# Patient Record
Sex: MALE | Race: WHITE | NOT HISPANIC OR LATINO
[De-identification: names, ages, dates, MRNs, and addresses within clinical notes are randomized per-mention and may not be internally consistent; named-entity substitution may affect disease eponyms.]

---

## 2018-09-25 ENCOUNTER — RESULT REVIEW (OUTPATIENT)
Age: 68
End: 2018-09-25

## 2018-11-07 PROBLEM — Z00.00 ENCOUNTER FOR PREVENTIVE HEALTH EXAMINATION: Status: ACTIVE | Noted: 2018-11-07

## 2018-12-01 ENCOUNTER — CLINICAL ADVICE (OUTPATIENT)
Age: 68
End: 2018-12-01

## 2019-01-16 ENCOUNTER — RECORD ABSTRACTING (OUTPATIENT)
Age: 69
End: 2019-01-16

## 2019-01-16 DIAGNOSIS — Z78.9 OTHER SPECIFIED HEALTH STATUS: ICD-10-CM

## 2019-01-17 ENCOUNTER — RECORD ABSTRACTING (OUTPATIENT)
Age: 69
End: 2019-01-17

## 2019-01-17 DIAGNOSIS — K22.719 BARRETT'S ESOPHAGUS WITH DYSPLASIA, UNSPECIFIED: ICD-10-CM
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
## 2019-01-17 DIAGNOSIS — Z87.19 PERSONAL HISTORY OF OTHER DISEASES OF THE DIGESTIVE SYSTEM: ICD-10-CM

## 2019-01-17 DIAGNOSIS — S49.90XA UNSPECIFIED INJURY OF SHOULDER AND UPPER ARM, UNSPECIFIED ARM, INITIAL ENCOUNTER: ICD-10-CM

## 2019-01-17 DIAGNOSIS — Z82.49 FAMILY HISTORY OF ISCHEMIC HEART DISEASE AND OTHER DISEASES OF THE CIRCULATORY SYSTEM: ICD-10-CM

## 2019-01-17 DIAGNOSIS — N52.9 MALE ERECTILE DYSFUNCTION, UNSPECIFIED: ICD-10-CM

## 2019-01-17 DIAGNOSIS — Z87.898 PERSONAL HISTORY OF OTHER SPECIFIED CONDITIONS: ICD-10-CM

## 2019-01-17 DIAGNOSIS — Z87.81 PERSONAL HISTORY OF (HEALED) TRAUMATIC FRACTURE: ICD-10-CM

## 2019-01-17 DIAGNOSIS — E79.0 HYPERURICEMIA W/OUT SIGNS OF INFLAMMATORY ARTHRITIS AND TOPHACEOUS DISEASE: ICD-10-CM

## 2019-01-17 DIAGNOSIS — Z87.39 PERSONAL HISTORY OF OTHER DISEASES OF THE MUSCULOSKELETAL SYSTEM AND CONNECTIVE TISSUE: ICD-10-CM

## 2019-01-17 DIAGNOSIS — S01.85XA OPEN BITE OF OTHER PART OF HEAD, INITIAL ENCOUNTER: ICD-10-CM

## 2019-01-17 DIAGNOSIS — Z85.840 PERSONAL HISTORY OF MALIGNANT NEOPLASM OF EYE: ICD-10-CM

## 2019-01-17 DIAGNOSIS — Z86.39 PERSONAL HISTORY OF OTHER ENDOCRINE, NUTRITIONAL AND METABOLIC DISEASE: ICD-10-CM

## 2019-01-17 DIAGNOSIS — Z86.010 PERSONAL HISTORY OF COLONIC POLYPS: ICD-10-CM

## 2019-01-17 DIAGNOSIS — S86.019A STRAIN OF UNSPECIFIED ACHILLES TENDON, INITIAL ENCOUNTER: ICD-10-CM

## 2019-01-17 DIAGNOSIS — Z92.3 PERSONAL HISTORY OF IRRADIATION: ICD-10-CM

## 2019-01-17 DIAGNOSIS — S42.301A UNSPECIFIED FRACTURE OF SHAFT OF HUMERUS, RIGHT ARM, INITIAL ENCOUNTER FOR CLOSED FRACTURE: ICD-10-CM

## 2019-01-18 ENCOUNTER — NON-APPOINTMENT (OUTPATIENT)
Age: 69
End: 2019-01-18

## 2019-01-18 ENCOUNTER — APPOINTMENT (OUTPATIENT)
Dept: CARDIOLOGY | Facility: CLINIC | Age: 69
End: 2019-01-18
Payer: MEDICARE

## 2019-01-18 VITALS
HEART RATE: 72 BPM | HEIGHT: 72 IN | SYSTOLIC BLOOD PRESSURE: 120 MMHG | WEIGHT: 190 LBS | DIASTOLIC BLOOD PRESSURE: 68 MMHG | BODY MASS INDEX: 25.73 KG/M2

## 2019-01-18 PROCEDURE — 93000 ELECTROCARDIOGRAM COMPLETE: CPT

## 2019-01-18 PROCEDURE — 99214 OFFICE O/P EST MOD 30 MIN: CPT

## 2019-01-18 RX ORDER — RAMIPRIL 10 MG/1
10 CAPSULE ORAL
Refills: 0 | Status: DISCONTINUED | COMMUNITY
End: 2019-01-18

## 2019-01-18 RX ORDER — ASPIRIN ENTERIC COATED TABLETS 81 MG 81 MG/1
81 TABLET, DELAYED RELEASE ORAL
Refills: 0 | Status: DISCONTINUED | COMMUNITY
End: 2019-01-18

## 2019-01-18 RX ORDER — SIMVASTATIN 20 MG/1
20 TABLET, FILM COATED ORAL
Refills: 0 | Status: DISCONTINUED | COMMUNITY
End: 2019-01-18

## 2019-01-18 RX ORDER — COLCHICINE 0.6 MG/1
0.6 TABLET ORAL TWICE DAILY
Refills: 0 | Status: ACTIVE | COMMUNITY
Start: 2019-01-18

## 2019-01-18 RX ORDER — HYDROCHLOROTHIAZIDE 12.5 MG/1
TABLET ORAL
Refills: 0 | Status: DISCONTINUED | COMMUNITY

## 2019-01-18 NOTE — DISCUSSION/SUMMARY
[FreeTextEntry1] : Hypertension.\par Good control. His diuretic has been changed from hydrochlorothiazide to indapamide to help with uric acid.\par \par Dyslipidemia.\par This has been under control. It is reasonable to change his simvastatin to rosuvastatin 5 mg daily.\par \par Carotid atherosclerosis.\par Based on previous carotid duplex 2015. Insignificant plaque. Continue risk factor reduction.

## 2019-01-18 NOTE — HISTORY OF PRESENT ILLNESS
[FreeTextEntry1] : Since his last examination he has reported some medical interactions. He reports that he was not taking his allopurinol regularly. 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 He did take colchicine with improvement. He is now back on his medications. A change of his hydrochlorothiazide to indapamide was also made.\par \par The patient presents sending Samir from his primary care physician, Dr. Lulú Vanegas, inquiring regarding a change of his statin. This is certainly reasonable. There is a potential interaction between simvastatin and verapamil.\par \par There have been no symptoms of chest discomfort, shortness of breath, palpitation, lightheadedness. He admits he is not as active as he should be. I incurred him regarding this.

## 2019-01-18 NOTE — REASON FOR VISIT
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

## 2019-01-18 NOTE — PHYSICAL EXAM
[General Appearance - Well Developed] : well developed [Normal Appearance] : normal appearance [Well Groomed] : well groomed [General Appearance - Well Nourished] : well nourished [No Deformities] : no deformities [General Appearance - In No Acute Distress] : no acute distress [Normal Oral Mucosa] : normal oral mucosa [No Oral Pallor] : no oral pallor [No Oral Cyanosis] : no oral cyanosis [Normal Jugular Venous A Waves Present] : normal jugular venous A waves present [Normal Jugular Venous V Waves Present] : normal jugular venous V waves present [No Jugular Venous Reyes A Waves] : no jugular venous reyes A waves [Respiration, Rhythm And Depth] : normal respiratory rhythm and effort [Exaggerated Use Of Accessory Muscles For Inspiration] : no accessory muscle use [Auscultation Breath Sounds / Voice Sounds] : lungs were clear to auscultation bilaterally [Heart Rate And Rhythm] : heart rate and rhythm were normal [Heart Sounds] : normal S1 and S2 [Murmurs] : no murmurs present [Abdomen Soft] : soft [Abdomen Tenderness] : non-tender [Abdomen Mass (___ Cm)] : no abdominal mass palpated [Abnormal Walk] : normal gait [Gait - Sufficient For Exercise Testing] : the gait was sufficient for exercise testing [Nail Clubbing] : no clubbing of the fingernails [Cyanosis, Localized] : no localized cyanosis [Petechial Hemorrhages (___cm)] : no petechial hemorrhages [Skin Color & Pigmentation] : normal skin color and pigmentation [] : no rash [No Venous Stasis] : no venous stasis [Skin Lesions] : no skin lesions [No Skin Ulcers] : no skin ulcer [No Xanthoma] : no  xanthoma was observed [Oriented To Time, Place, And Person] : oriented to person, place, and time [Affect] : the affect was normal [Mood] : the mood was normal [No Anxiety] : not feeling anxious [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

## 2019-02-07 ENCOUNTER — APPOINTMENT (OUTPATIENT)
Dept: UROLOGY | Facility: CLINIC | Age: 69
End: 2019-02-07
Payer: MEDICARE

## 2019-02-07 VITALS
HEART RATE: 63 BPM | HEIGHT: 72 IN | BODY MASS INDEX: 25.73 KG/M2 | DIASTOLIC BLOOD PRESSURE: 56 MMHG | SYSTOLIC BLOOD PRESSURE: 144 MMHG | WEIGHT: 190 LBS

## 2019-02-07 LAB
BILIRUB UR QL STRIP: NORMAL
CLARITY UR: CLEAR
COLLECTION METHOD: NORMAL
GLUCOSE UR-MCNC: NORMAL
HCG UR QL: NORMAL EU/DL
HGB UR QL STRIP.AUTO: NORMAL
KETONES UR-MCNC: NORMAL
LEUKOCYTE ESTERASE UR QL STRIP: NORMAL
NITRITE UR QL STRIP: NORMAL
PH UR STRIP: 6
PROT UR STRIP-MCNC: NORMAL
SP GR UR STRIP: 1.01

## 2019-02-07 PROCEDURE — 81000 URINALYSIS NONAUTO W/SCOPE: CPT

## 2019-02-07 PROCEDURE — 99213 OFFICE O/P EST LOW 20 MIN: CPT | Mod: 25

## 2019-02-07 RX ORDER — VERAPAMIL HYDROCHLORIDE 240 MG/1
240 TABLET ORAL
Qty: 90 | Refills: 3 | Status: DISCONTINUED | COMMUNITY
End: 2019-02-07

## 2019-02-07 RX ORDER — LEVOTHYROXINE SODIUM 0.11 MG/1
112 TABLET ORAL DAILY
Refills: 0 | Status: ACTIVE | COMMUNITY

## 2019-03-01 LAB
BACTERIA: 0
CASTS: 0
CRYSTALS: 0
MUCUS: 0
PSA FREE FLD-MCNC: 33 %
PSA FREE SERPL-MCNC: 0.43 NG/ML
PSA SERPL-MCNC: 1.32 NG/ML
RBC CASTS # UR COMP ASSIST: 0
WBC: 0

## 2019-03-01 NOTE — PHYSICAL EXAM
[General Appearance - Well Nourished] : well nourished [Normal Appearance] : normal appearance [Well Groomed] : well groomed [General Appearance - In No Acute Distress] : no acute distress [Bowel Sounds] : normal bowel sounds [Abdomen Soft] : soft [Abdomen Tenderness] : non-tender [Abdomen Mass (___ Cm)] : no abdominal mass palpated [Abdomen Hernia] : no hernia was discovered [Costovertebral Angle Tenderness] : no ~M costovertebral angle tenderness [Size (3+)] : size was 3+ [Nl Sphincter Tone] : normal sphincter tone [Nl Perineum] : perineum was normal on inspection [No Lesions] : no lesions [Circumcised] : the penis was circumcised [Normal] : normal [Testes] : normal [Epididymis] : was normal [5th Left ICS - MCL] : palpated at the 5th LICS in the midclavicular line [Normal Rate] : normal [Normal S1] : normal S1 [Normal S2] : normal S2 [No Murmur] : no murmurs heard [No Pitting Edema] : no pitting edema present [] : no respiratory distress [Respiration, Rhythm And Depth] : normal respiratory rhythm and effort [Exaggerated Use Of Accessory Muscles For Inspiration] : no accessory muscle use [Auscultation Breath Sounds / Voice Sounds] : lungs were clear to auscultation bilaterally [Chest Palpation] : palpation of the chest revealed no abnormalities [Lungs Percussion] : the lungs were normal to percussion [Oriented To Time, Place, And Person] : oriented to person, place, and time [Affect] : the affect was normal [Mood] : the mood was normal [Not Anxious] : not anxious [Normal Station and Gait] : the gait and station were normal for the patient's age [No Focal Deficits] : no focal deficits [No Palpable Adenopathy] : no palpable adenopathy [Prostate Hard Area Or Nodule On The Right] : had a palpable nodule [Rectal Exam - Prostate] : was not indurated [Prostate Tenderness] : was not tender [Prostate Fluctuant] : was not fluctuant [Prostate Hard Area Or Nodule On The Left] : had no palpable nodules [Occult Blood Positive] : exam was negative for occult blood [Rectal Tenderness] : no tenderness [Mass___cm] : no rectal masses [S3] : no S3 [S4] : no S4

## 2019-03-01 NOTE — HISTORY OF PRESENT ILLNESS
[FreeTextEntry1] : (Last seen 11/06/18)\par \par        Seb Rosas is a 68-year-old gentleman with history of biopsy proven prostate cancer though with extremely small volumes of low-grade disease managed with "watchful waiting" for the past 8-1/2 years. Mr. Rosas's most recent PSA from 11/06/18 was 1.21 with a free PSA of 32%.\par  \par        Mr. Rosas also has a history of modest obstructive voiding symptoms and marked prostatic enlargement further complicated by the presence of a large ball valving median lobe. His postvoid residual has remained in the range 100 cc. Cystoscopy performed on 11/06/17 confirmed significant outlet obstruction, 3+ bladder trabeculation, a compromised urinary stream, and the postvoid residual of approximately 170 cc. Mr. Rosas he has been reluctant to undergo any surgical intervention despite significant prostatic enlargement. On last evaluated in August of 2018 the prostatic volume was found to be 159.01 cc using ultrasound criteria. At that time the postvoid residual was 81 cc with double voiding. He is aware that failure to respond to pharmacologic management would warrant surgical intervention. He is currently on tamsulosin b.i.d.\par        .\par        PERTINENT UROLOGIC HISTORY:\par        .\par         07/15/10: PSA 1.40; free PSA 14%, TRUS positive for a right-sided hypoechoic nodule, gland estimated to be 79 cc\par         08/04/10: PROSTATE BIOPSY (+); right sided nodule, 2 of 12 biopsies positive for Priscilla 6 (3+3) involving no more than 1% of each core, prepped second opinion declared one of the biopsies negative the other equivocal)\par         01/09/11: PROSTATE BIOPSY #2- (-) for cancer, atypia in one biopsy on the left\par         05/03/11: PSA 2.39; free PSA 10% ([facility])\par         09/02/11: PSA 0.90; free PSA 20%\par         04/13/12: PSA 1.26; free PSA 22%\par         09/20/11: MRI- "abnormal signal of the LEFT aspect of the peripheral zone without extension through the capsule" (findings are consistent with the physical exam, ultrasound findings, and actual biopsy results)\par         10/18/13: PSA 0.58; free PSA 40% ([facility])\par         04/29/14: PSA 0.83; free PSA 20%\par         09/25/15: PSA 0.99; testosterone 147\par         02/25/16: PSA 1.86; free PSA 20% (The change in PSA was attributed to the fact the patient was seen in the new offices in the lab test was sent to the laboratory)\par        05/24/16: PSA 1.77; free PSA 22%\par        08/06/18: PELVIC ULTRASONOGRAPHY; prostatic volume 159.01 cc; PPSA 19.08; postvoid residual (with double voiding) 81 cc\par        08/06/16: PSA 1.20 ng/ml; free PSA 32%\par        03/16/17: PSA 1.15; free PSA 33%\par        03/16/17: PELVIC ULTRASONOGRAPHY; postvoid residual 100 cc; prostatic volume 109 cc; PPSA 13.13; (2000 and prostatic volume was 79 cc)\par        09/25/17: PSA 1.13\par        02/06/18: PSA 1.23; free PSA 33%\par 11/06/18: PSA 1.21; free PSA 32%\par \par CURRENT UROLOGIC REVIEW OF SYSTEMS:\par \par Incontinence Assessed?.  YES\par \par Frequency:  (+) 6 x/day    \par Nocturia:  (+) 3 x/night\par Dysuria: (-)\par Urgency:  (-) \par Hesitancy:  (-)\par Intermittency:  (-)\par Sensation of Incomplete Voiding :  (-)\par Double voiding :  (-)\par Stress incontinence:  (-)\par Urge incontinence:  (-)\par Use of absorbent pads:  (-)\par Terminal dribbling:  (-)\par Status of stream:   "VARIES, CERTAINLY NOT STRONG"\par Venereal disease:  (-)\par Kidney stones:  (-)\par UTIs:  (-)\par Prior urologic surgery:  (+) (PBx)\par Hematuria:  (-)\par Abdominal pressure:  (-)\par Back pain:  (-) CHRONIC NON-UROLOGIC BACK PAIN\par Sexual Status. ACTIVE; BETTER WITH SILDENAFIL\par Gout:  (+)  LAST ATTACK 11/2018\par Renal problems:  (-)\par Family History of Urologic Problems:  (+) SON HAS HAD KIDNEY STONES\par International Prostate Symptom Score (IPSS): 14 (Moderate 8-19)\par Satisfaction Index:  2 (mostly satisfied)\par \par \par

## 2019-03-01 NOTE — CHIEF COMPLAINT
[FreeTextEntry1] : 1.  PROSTATE CANCER:  Managed with "watchful waiting since 08/04/10\par \par 2.  BPH/LUTS WITH INCOMPLETE EMPTYING

## 2019-03-01 NOTE — ASSESSMENT
[FreeTextEntry1] : Seb Rosas is a 69yo man with a history of low grade low volume prostate cancer managed with watchful waiting.  He has no evidence of clinically significant disease.  He still has a large prostate with acceptable LUTS.

## 2019-03-01 NOTE — REVIEW OF SYSTEMS
[see HPI] : see HPI [Negative] : Endocrine [Fever] : no fever [Chills] : no chills [Feeling Poorly] : not feeling poorly [Feeling Tired] : not feeling tired [Recent Weight Gain (___ Lbs)] : no recent weight gain [Recent Weight Loss (___ Lbs)] : no recent weight loss [Loss Of Hearing] : no hearing loss [Nosebleeds] : no nosebleeds [Chest Pain] : no chest pain [Palpitations] : no palpitations [Leg Claudication] : no intermittent leg claudication [Lower Ext Edema] : no extremity edema [Shortness Of Breath] : no shortness of breath [Cough] : no cough [Orthopnea] : no orthopnea [Wheezing] : no wheezing [SOB on Exertion] : no shortness of breath during exertion [PND] : no PND [Abdominal Pain] : no abdominal pain [Vomiting] : no vomiting [Constipation] : no constipation [Diarrhea] : no diarrhea [Heartburn] : no heartburn [Melena] : no melena [Arthralgias] : no arthralgias [Joint Pain] : no joint pain [Suicidal] : not suicidal [Sleep Disturbances] : no sleep disturbances [Anxiety] : no anxiety [Depression] : no depression [Easy Bleeding] : no tendency for easy bleeding [Easy Bruising] : no tendency for easy bruising [Swollen Glands] : no swollen glands

## 2019-07-13 ENCOUNTER — RX RENEWAL (OUTPATIENT)
Age: 69
End: 2019-07-13

## 2019-08-08 ENCOUNTER — APPOINTMENT (OUTPATIENT)
Dept: UROLOGY | Facility: CLINIC | Age: 69
End: 2019-08-08
Payer: MEDICARE

## 2019-08-08 VITALS
BODY MASS INDEX: 25.9 KG/M2 | SYSTOLIC BLOOD PRESSURE: 144 MMHG | WEIGHT: 191 LBS | DIASTOLIC BLOOD PRESSURE: 72 MMHG | HEART RATE: 73 BPM

## 2019-08-08 DIAGNOSIS — R33.9 RETENTION OF URINE, UNSPECIFIED: ICD-10-CM

## 2019-08-08 DIAGNOSIS — N40.1 BENIGN PROSTATIC HYPERPLASIA WITH LOWER URINARY TRACT SYMPMS: ICD-10-CM

## 2019-08-08 LAB
BACTERIA: 0
BILIRUB UR QL STRIP: NORMAL
CASTS: 0
CLARITY UR: CLEAR
COLLECTION METHOD: NORMAL
CRYSTALS: 0
EPITHELIAL CELLS: 0
GLUCOSE UR-MCNC: NORMAL
HCG UR QL: 0.2 EU/DL
HGB UR QL STRIP.AUTO: NORMAL
KETONES UR-MCNC: NORMAL
LEUKOCYTE ESTERASE UR QL STRIP: NORMAL
MUCUS: 0
NITRITE UR QL STRIP: NORMAL
PH UR STRIP: 5
PROT UR STRIP-MCNC: NORMAL
RBC CASTS # UR COMP ASSIST: 0
SP GR UR STRIP: 1.02
WBC: 0

## 2019-08-08 PROCEDURE — 81000 URINALYSIS NONAUTO W/SCOPE: CPT

## 2019-08-08 PROCEDURE — 99214 OFFICE O/P EST MOD 30 MIN: CPT | Mod: 25

## 2019-08-08 RX ORDER — INDAPAMIDE 1.25 MG/1
1.25 TABLET, FILM COATED ORAL
Qty: 30 | Refills: 0 | Status: DISCONTINUED | COMMUNITY
Start: 2018-10-24 | End: 2019-08-08

## 2019-08-08 RX ORDER — VERAPAMIL HYDROCHLORIDE 240 MG/1
240 CAPSULE, DELAYED RELEASE PELLETS ORAL
Qty: 90 | Refills: 0 | Status: DISCONTINUED | COMMUNITY
Start: 2018-09-04 | End: 2019-08-08

## 2019-08-08 RX ORDER — DILTIAZEM HYDROCHLORIDE 240 MG/1
240 CAPSULE, EXTENDED RELEASE ORAL
Qty: 90 | Refills: 0 | Status: DISCONTINUED | COMMUNITY
Start: 2019-01-28 | End: 2019-08-08

## 2019-08-08 NOTE — HISTORY OF PRESENT ILLNESS
[FreeTextEntry1] : (Last seen 02/07/19)\par \par  Seb Rosas is a 68-year-old gentleman with history of biopsy proven prostate cancer though with extremely small volumes of low-grade disease managed with "watchful waiting" for the past 8-1/2 years. Mr. Rosas's most recent PSA from 02/07/19 was 1.32 with a free PSA of 33%.\par  \par  Mr. Rosas also has a history of modest obstructive voiding symptoms and marked prostatic enlargement further complicated by the presence of a large ball valving median lobe. His postvoid residual has remained in the range 100 cc. Cystoscopy performed on 11/06/17 confirmed significant outlet obstruction, 3+ bladder trabeculation, a compromised urinary stream, and the postvoid residual of approximately 170 cc. Mr. Rosas has been reluctant to undergo any surgical intervention despite significant prostatic enlargement. When last evaluated in August of 2018 the prostatic volume was found to be 159.01 cc using ultrasound criteria. At that time the postvoid residual was 81 cc with double voiding. He is currently on tamsulosin b.i.d.   Mr. Rosas is aware that failure to respond to pharmacologic management would warrant surgical intervention.\par  .\par  PERTINENT UROLOGIC HISTORY:\par  .\par  07/15/10: PSA 1.40; free PSA 14%, TRUS positive for a right-sided hypoechoic nodule, gland estimated to be 79 cc\par  08/04/10: PROSTATE BIOPSY (+); right sided nodule, 2 of 12 biopsies positive for Priscilla 6 (3+3) involving no more than 1% of each core, prepped second opinion declared one of the biopsies negative the other equivocal)\par  01/09/11: PROSTATE BIOPSY #2- (-) for cancer, atypia in one biopsy on the left\par  05/03/11: PSA 2.39; free PSA 10% ([facility])\par  09/02/11: PSA 0.90; free PSA 20%\par  04/13/12: PSA 1.26; free PSA 22%\par  09/20/11: MRI- "abnormal signal of the LEFT aspect of the peripheral zone without extension through the capsule" (findings are consistent with the physical exam, ultrasound findings, and actual biopsy results)\par  10/18/13: PSA 0.58; free PSA 40% ([facility])\par  04/29/14: PSA 0.83; free PSA 20%\par  09/25/15: PSA 0.99; testosterone 147\par  02/25/16: PSA 1.86; free PSA 20% (The change in PSA was attributed to the fact the patient was seen in the new offices in the lab test was sent to the laboratory)\par  05/24/16: PSA 1.77; free PSA 22%\par  08/06/18: PELVIC ULTRASONOGRAPHY; prostatic volume 159.01 cc; PPSA 19.08; postvoid residual (with double voiding) 81 cc\par  08/06/16: PSA 1.20 ng/ml; free PSA 32%\par  03/16/17: PSA 1.15; free PSA 33%\par  03/16/17: PELVIC ULTRASONOGRAPHY; postvoid residual 100 cc; prostatic volume 109 cc; PPSA 13.13; (2000 and prostatic volume was 79 cc)\par  09/25/17: PSA 1.13\par  02/06/18: PSA 1.23; free PSA 33%\par 11/06/18:  PSA 1.21; free PSA 32\par 02/07/19:  PSA 1.32; free PSA 33%\par \par \par CURRENT UROLOGIC REVIEW OF SYSTEMS:\par \par Incontinence Assessed?. YES\par \par Frequency: (+) 6 x/day \par Nocturia: (+) 3 x/night\par Dysuria: (-)\par Urgency: (-) \par Hesitancy: (-)\par Intermittency: (-)\par Sensation of Incomplete Voiding : (-)\par Double voiding : (-)\par Stress incontinence: (-)\par Urge incontinence: (-)\par Use of absorbent pads: (-)\par Terminal dribbling: (-)\par Status of stream: "VARIES, CERTAINLY NOT STRONG"\par Venereal disease: (-)\par Kidney stones: (-)\par UTIs: (-)\par Prior urologic surgery: (+) (PBx)\par Hematuria: (-)\par Abdominal pressure: (-)\par Back pain: (-) CHRONIC NON-UROLOGIC BACK PAIN\par Sexual Status. ACTIVE; BETTER WITH SILDENAFIL\par Gout: (+) LAST ATTACK 11/2018\par Renal problems: (-)\par Family History of Urologic Problems: (+) SON HAS HAD KIDNEY STONES\par International Prostate Symptom Score (IPSS): 14 (Moderate 8-19)\par Satisfaction Index: 3 (mixed)\par \par

## 2019-08-08 NOTE — REVIEW OF SYSTEMS
[see HPI] : see HPI [Negative] : Endocrine [Fever] : no fever [Feeling Poorly] : not feeling poorly [Chills] : no chills [Feeling Tired] : not feeling tired [Recent Weight Gain (___ Lbs)] : no recent weight gain [Recent Weight Loss (___ Lbs)] : no recent weight loss [Nosebleeds] : no nosebleeds [Loss Of Hearing] : no hearing loss [Chest Pain] : no chest pain [Palpitations] : no palpitations [Lower Ext Edema] : no extremity edema [Leg Claudication] : no intermittent leg claudication [Cough] : no cough [Shortness Of Breath] : no shortness of breath [Orthopnea] : no orthopnea [Wheezing] : no wheezing [SOB on Exertion] : no shortness of breath during exertion [PND] : no PND [Abdominal Pain] : no abdominal pain [Vomiting] : no vomiting [Constipation] : no constipation [Diarrhea] : no diarrhea [Heartburn] : no heartburn [Melena] : no melena [Arthralgias] : no arthralgias [Joint Pain] : no joint pain [Sleep Disturbances] : no sleep disturbances [Suicidal] : not suicidal [Anxiety] : no anxiety [Easy Bleeding] : no tendency for easy bleeding [Depression] : no depression [Easy Bruising] : no tendency for easy bruising [Swollen Glands] : no swollen glands

## 2019-08-08 NOTE — PHYSICAL EXAM
[General Appearance - Well Nourished] : well nourished [Normal Appearance] : normal appearance [Well Groomed] : well groomed [General Appearance - In No Acute Distress] : no acute distress [Bowel Sounds] : normal bowel sounds [Abdomen Soft] : soft [Abdomen Tenderness] : non-tender [Abdomen Mass (___ Cm)] : no abdominal mass palpated [Abdomen Hernia] : no hernia was discovered [Costovertebral Angle Tenderness] : no ~M costovertebral angle tenderness [Size (3+)] : size was 3+ [Rectal Exam - Prostate] : was not indurated [Prostate Fluctuant] : was not fluctuant [Prostate Tenderness] : was not tender [Prostate Hard Area Or Nodule On The Right] : had a palpable nodule [Nl Sphincter Tone] : normal sphincter tone [Prostate Hard Area Or Nodule On The Left] : had no palpable nodules [Occult Blood Positive] : exam was negative for occult blood [Rectal Tenderness] : no tenderness [Mass___cm] : no rectal masses [Nl Perineum] : perineum was normal on inspection [No Lesions] : no lesions [Circumcised] : the penis was circumcised [Normal] : normal [Testes] : normal [Epididymis] : was normal [5th Left ICS - MCL] : palpated at the 5th LICS in the midclavicular line [Normal S1] : normal S1 [Normal Rate] : normal [S3] : no S3 [Normal S2] : normal S2 [S4] : no S4 [No Pitting Edema] : no pitting edema present [No Murmur] : no murmurs heard [] : no respiratory distress [Exaggerated Use Of Accessory Muscles For Inspiration] : no accessory muscle use [Respiration, Rhythm And Depth] : normal respiratory rhythm and effort [Auscultation Breath Sounds / Voice Sounds] : lungs were clear to auscultation bilaterally [Chest Palpation] : palpation of the chest revealed no abnormalities [Lungs Percussion] : the lungs were normal to percussion [Affect] : the affect was normal [Oriented To Time, Place, And Person] : oriented to person, place, and time [Mood] : the mood was normal [Not Anxious] : not anxious [No Focal Deficits] : no focal deficits [Normal Station and Gait] : the gait and station were normal for the patient's age [No Palpable Adenopathy] : no palpable adenopathy

## 2019-08-08 NOTE — ASSESSMENT
[FreeTextEntry1] : Seb Rosas is a 68-year-old gentleman found to have low-grade low-volume prostate cancer in August of 2010. Since that time physical exam has remained unchanged and PSA levels have remained under 2.0 mg/ML despite the fact that the prostate gland is well over 100 g. The patient does not empty his bladder completely and consistently S. postvoid residual is estimated to be approximately 100 cc. Despite that he is now interested in pursuing surgical intervention. He continues to take tamsulosin b.i.d. and remains on q.3 monthly surveillance.\par \par Mr. Rosas is aware that I will be retiring at the end of this month. He will arrange for further urologic coverage through his primary care physician.

## 2019-08-09 LAB — PSA SERPL-MCNC: 1.45 NG/ML

## 2019-12-30 ENCOUNTER — RX RENEWAL (OUTPATIENT)
Age: 69
End: 2019-12-30

## 2020-02-03 ENCOUNTER — RESULT REVIEW (OUTPATIENT)
Age: 70
End: 2020-02-03

## 2020-02-04 ENCOUNTER — APPOINTMENT (OUTPATIENT)
Dept: GASTROENTEROLOGY | Facility: HOSPITAL | Age: 70
End: 2020-02-04

## 2020-02-05 ENCOUNTER — APPOINTMENT (OUTPATIENT)
Dept: CARDIOLOGY | Facility: CLINIC | Age: 70
End: 2020-02-05
Payer: MEDICARE

## 2020-02-05 ENCOUNTER — NON-APPOINTMENT (OUTPATIENT)
Age: 70
End: 2020-02-05

## 2020-02-05 VITALS
SYSTOLIC BLOOD PRESSURE: 110 MMHG | HEART RATE: 67 BPM | WEIGHT: 190 LBS | DIASTOLIC BLOOD PRESSURE: 65 MMHG | HEIGHT: 72 IN | BODY MASS INDEX: 25.73 KG/M2

## 2020-02-05 PROCEDURE — 93000 ELECTROCARDIOGRAM COMPLETE: CPT

## 2020-02-05 PROCEDURE — 99214 OFFICE O/P EST MOD 30 MIN: CPT

## 2020-02-05 RX ORDER — TAMSULOSIN HYDROCHLORIDE 0.4 MG/1
0.4 CAPSULE ORAL
Qty: 180 | Refills: 3 | Status: DISCONTINUED | COMMUNITY
Start: 2019-02-07 | End: 2020-02-05

## 2020-02-05 RX ORDER — ALLOPURINOL 300 MG/1
300 TABLET ORAL DAILY
Refills: 0 | Status: ACTIVE | COMMUNITY

## 2020-02-05 NOTE — HISTORY OF PRESENT ILLNESS
[FreeTextEntry1] : This 69 year-old male patient presents for cardiovascular evaluation.\par \par His problem list is as noted above.\par \par Since his last examination 1 year ago he reports no major events or hospitalizations.  He is active and exercising.  He has a new primary care physician.  Dr. Lulú Vanegas has retired.\par \par The patient also has a new urologist, Dr. Cannon.\par \par There are no acute symptoms of shortness of breath, chest discomfort, palpitation, lightheadedness, fainting.\par \par He has had some medication changes as noted below.\par \par He saw his gastroenterologist and had endoscopic procedures just yesterday.  The patient reports that his Newman's is satisfactory.  He did have 9 polyps removed from his colon.

## 2020-02-05 NOTE — DISCUSSION/SUMMARY
[FreeTextEntry1] : Brief recommendations and follow-up: (see above for details)\par \par His overall cardiovascular status is stable.  Blood pressure and lipids well controlled.\par He needs work on exercise and diet.\par He will follow-up with his primary care physician.\par Next routine follow-up 1 year

## 2020-02-05 NOTE — REASON FOR VISIT
[FreeTextEntry1] : Mr. JAY ESCOBAR has the following problem list:\par \par Hypertension\par Dyslipidemia\par Carotid atherosclerosis\par \par He has additional medical problems as noted\par \par His primary care physician is Doctor Rachana Zelaya.\par \par

## 2020-08-17 ENCOUNTER — RX RENEWAL (OUTPATIENT)
Age: 70
End: 2020-08-17

## 2020-11-03 ENCOUNTER — RX RENEWAL (OUTPATIENT)
Age: 70
End: 2020-11-03

## 2021-02-09 ENCOUNTER — NON-APPOINTMENT (OUTPATIENT)
Age: 71
End: 2021-02-09

## 2021-02-09 ENCOUNTER — APPOINTMENT (OUTPATIENT)
Dept: CARDIOLOGY | Facility: CLINIC | Age: 71
End: 2021-02-09
Payer: MEDICARE

## 2021-02-09 VITALS
HEIGHT: 72 IN | DIASTOLIC BLOOD PRESSURE: 64 MMHG | TEMPERATURE: 97.7 F | SYSTOLIC BLOOD PRESSURE: 100 MMHG | BODY MASS INDEX: 24.38 KG/M2 | WEIGHT: 180 LBS | HEART RATE: 67 BPM

## 2021-02-09 PROCEDURE — 99214 OFFICE O/P EST MOD 30 MIN: CPT

## 2021-02-09 PROCEDURE — 99072 ADDL SUPL MATRL&STAF TM PHE: CPT

## 2021-02-09 PROCEDURE — 93000 ELECTROCARDIOGRAM COMPLETE: CPT

## 2021-02-09 RX ORDER — BRIMONIDINE TARTRATE 2 MG/MG
0.2 SOLUTION/ DROPS OPHTHALMIC
Refills: 0 | Status: ACTIVE | COMMUNITY

## 2021-02-09 RX ORDER — INDAPAMIDE 2.5 MG/1
2.5 TABLET, FILM COATED ORAL DAILY
Refills: 0 | Status: DISCONTINUED | COMMUNITY
End: 2021-02-09

## 2021-02-09 NOTE — DISCUSSION/SUMMARY
[FreeTextEntry1] : Brief recommendations and follow-up: (see above for details)\par \par Patient's cardiovascular problem list is well compensated.\par Blood pressure is improved with weight loss.\par I am discontinuing his indapamide today.  Continue other medications at this time.\par I look forward to receiving his laboratories.\par Noncardiac work-up is underway including for hematuria, renal and possible pancreatic abnormalities.\par I advised the patient to call his primary care physician to discuss his allopurinol, not being taken regularly.\par Next routine cardiology visit 1 year.

## 2021-02-09 NOTE — ASSESSMENT
[FreeTextEntry1] : EKG 2/9/2021.  Sinus rhythm.  QR complex lead III.  T wave inversion lead III aVF.  Poor R wave progression.  No acute features.\par EKG 2/5/2020.  Sinus rhythm.  QR complex lead III.  Poor R wave progression.  Generalized T wave flattening.  VPC.  No acute changes.\par EKG 1/18/19. Sinus rhythm. Poor R wave progression. QR complex lead 3. No acute changes.

## 2021-02-09 NOTE — PHYSICAL EXAM
[General Appearance - Well Developed] : well developed [Normal Appearance] : normal appearance [Well Groomed] : well groomed [No Deformities] : no deformities [General Appearance - Well Nourished] : well nourished [General Appearance - In No Acute Distress] : no acute distress [Normal Oral Mucosa] : normal oral mucosa [No Oral Pallor] : no oral pallor [No Oral Cyanosis] : no oral cyanosis [Normal Jugular Venous A Waves Present] : normal jugular venous A waves present [Normal Jugular Venous V Waves Present] : normal jugular venous V waves present [No Jugular Venous Reyes A Waves] : no jugular venous reyes A waves [Respiration, Rhythm And Depth] : normal respiratory rhythm and effort [Exaggerated Use Of Accessory Muscles For Inspiration] : no accessory muscle use [Auscultation Breath Sounds / Voice Sounds] : lungs were clear to auscultation bilaterally [Heart Sounds] : normal S1 and S2 [Heart Rate And Rhythm] : heart rate and rhythm were normal [Abdomen Soft] : soft [Abdomen Tenderness] : non-tender [Abdomen Mass (___ Cm)] : no abdominal mass palpated [Gait - Sufficient For Exercise Testing] : the gait was sufficient for exercise testing [Abnormal Walk] : normal gait [Nail Clubbing] : no clubbing of the fingernails [Petechial Hemorrhages (___cm)] : no petechial hemorrhages [Cyanosis, Localized] : no localized cyanosis [Skin Color & Pigmentation] : normal skin color and pigmentation [No Venous Stasis] : no venous stasis [] : no rash [Skin Lesions] : no skin lesions [No Xanthoma] : no  xanthoma was observed [No Skin Ulcers] : no skin ulcer [Oriented To Time, Place, And Person] : oriented to person, place, and time [Affect] : the affect was normal [No Anxiety] : not feeling anxious [Mood] : the mood was normal [FreeTextEntry1] : Faint 1/6 systolic murmur left sternal border, localized.

## 2021-02-09 NOTE — HISTORY OF PRESENT ILLNESS
[FreeTextEntry1] : This 70 year-old male patient presents for cardiovascular evaluation.\par \par His problem list is as noted above.\par \par Since his last examination 1 year ago he has had a number of medical interactions.\par \par The patient had an episode of hematuria.  He saw his urologist, now Dr. Cannon.  Reportedly there was some findings on his kidney but after MRI reportedly the findings were satisfactory.  These details are not confirmed to me.  Also reportedly there was an incidental pancreatic abnormality.  The patient has contacted his gastroenterologist, Dr. Alexandra.  He is awaiting a discussion.\par \par The patient reports he has been under considerable stress.  His wife recently suffered a seizure and has a history of epilepsy and fractured her ankle.  The patient is caring for her.\par \par The patient reports he is otherwise been feeling well.  He is physically very active chopping wood.  No exertional symptoms of shortness of breath, chest discomfort, palpitation, lightheadedness, fainting.  He has noted some occasional minor lightheadedness.\par \par Of additional note is that the patient is being treated for glaucoma in his remaining sighted left eye.  The patient also reports that he is not taking his allopurinol regularly.  I advised him to discuss this with his primary care physician.\par \par He reports he has seen his primary care physician and had laboratories.  They are not a my receipt.  The patient will call and have them forwarded.\par \par

## 2021-02-10 ENCOUNTER — NON-APPOINTMENT (OUTPATIENT)
Age: 71
End: 2021-02-10

## 2021-02-24 ENCOUNTER — APPOINTMENT (OUTPATIENT)
Dept: GASTROENTEROLOGY | Facility: CLINIC | Age: 71
End: 2021-02-24
Payer: MEDICARE

## 2021-02-24 VITALS
BODY MASS INDEX: 24.38 KG/M2 | WEIGHT: 180 LBS | DIASTOLIC BLOOD PRESSURE: 70 MMHG | HEIGHT: 72 IN | OXYGEN SATURATION: 98 % | TEMPERATURE: 98.4 F | HEART RATE: 64 BPM | SYSTOLIC BLOOD PRESSURE: 140 MMHG

## 2021-02-24 DIAGNOSIS — K86.2 CYST OF PANCREAS: ICD-10-CM

## 2021-02-24 DIAGNOSIS — Z86.010 PERSONAL HISTORY OF COLONIC POLYPS: ICD-10-CM

## 2021-02-24 DIAGNOSIS — K22.70 BARRETT'S ESOPHAGUS W/OUT DYSPLASIA: ICD-10-CM

## 2021-02-24 PROCEDURE — 99214 OFFICE O/P EST MOD 30 MIN: CPT

## 2021-02-24 PROCEDURE — 99072 ADDL SUPL MATRL&STAF TM PHE: CPT

## 2021-02-24 NOTE — CONSULT LETTER
[FreeTextEntry1] : Dear Dr. SAM LAWLER ,\par \par I had the pleasure of evaluating your patient,  JAY ESCOBAR.\par \par Please refer to my note below.\par \par Thank you very much for allowing me to participate in the care of this patient.  If you have any questions, please do not hesitate to contact me.\par \par Sincerely, \par \par Bin Alexandra MD\par

## 2021-02-24 NOTE — ASSESSMENT
[FreeTextEntry1] : -pancreas cyst - no concerning features - repeat MRI 2y\par \par -Barretts -  PPI.  Dietary and lifestyle modifications, including weight loss, smaller/frequent/earlier (>3h prior to lying down) meals, trials of cutting down/out caffeine, chocolate, tomatoes, fatty foods, alcohol.  EGD - pt wants to consolidate procedures - due for colonoscopy now\par \par -hx colon polyps - 9 last year - will do a 1year f/u at this time - some were advanced (size criteria). Colonoscopy scheduled - Risks, benefits, alternatives were discussed, including but not limited to bleeding, infection, perforation and sedation risks. Additionally, the possibility of missed lesions was conveyed.\par \par -renal lesion f/u w/ Dr. Cannon\par \par PMD/consultation notes and Labs/imaging/prior endoscopic results reviewed to extent noted in HPI; and, if procedure code billed on this visit for lab draw, this serves to signify that labs were drawn here in this office.\par \par

## 2021-03-12 ENCOUNTER — RESULT REVIEW (OUTPATIENT)
Age: 71
End: 2021-03-12

## 2021-03-14 ENCOUNTER — RESULT REVIEW (OUTPATIENT)
Age: 71
End: 2021-03-14

## 2021-03-15 ENCOUNTER — APPOINTMENT (OUTPATIENT)
Dept: GASTROENTEROLOGY | Facility: HOSPITAL | Age: 71
End: 2021-03-15

## 2021-05-25 ENCOUNTER — RX RENEWAL (OUTPATIENT)
Age: 71
End: 2021-05-25

## 2021-09-26 ENCOUNTER — TRANSCRIPTION ENCOUNTER (OUTPATIENT)
Age: 71
End: 2021-09-26

## 2021-10-20 ENCOUNTER — RX RENEWAL (OUTPATIENT)
Age: 71
End: 2021-10-20

## 2021-11-14 ENCOUNTER — RX RENEWAL (OUTPATIENT)
Age: 71
End: 2021-11-14

## 2022-02-09 ENCOUNTER — NON-APPOINTMENT (OUTPATIENT)
Age: 72
End: 2022-02-09

## 2022-02-15 ENCOUNTER — APPOINTMENT (OUTPATIENT)
Dept: CARDIOLOGY | Facility: CLINIC | Age: 72
End: 2022-02-15
Payer: MEDICARE

## 2022-02-15 ENCOUNTER — NON-APPOINTMENT (OUTPATIENT)
Age: 72
End: 2022-02-15

## 2022-02-15 VITALS
HEART RATE: 68 BPM | TEMPERATURE: 97.9 F | SYSTOLIC BLOOD PRESSURE: 130 MMHG | WEIGHT: 185 LBS | HEIGHT: 72 IN | BODY MASS INDEX: 25.06 KG/M2 | DIASTOLIC BLOOD PRESSURE: 80 MMHG | OXYGEN SATURATION: 99 %

## 2022-02-15 PROCEDURE — 93000 ELECTROCARDIOGRAM COMPLETE: CPT

## 2022-02-15 PROCEDURE — 99214 OFFICE O/P EST MOD 30 MIN: CPT

## 2022-02-15 RX ORDER — ASPIRIN 81 MG
81 TABLET, DELAYED RELEASE (ENTERIC COATED) ORAL
Refills: 0 | Status: DISCONTINUED | COMMUNITY
End: 2022-02-15

## 2022-02-15 NOTE — ASSESSMENT
[FreeTextEntry1] : EKG 2/15/2022.  Sinus rhythm.  Poor R wave progression.  QR complex lead III.  No acute changes.\par EKG 2/9/2021.  Sinus rhythm.  QR complex lead III.  T wave inversion lead III aVF.  Poor R wave progression.  No acute features.\par EKG 2/5/2020.  Sinus rhythm.  QR complex lead III.  Poor R wave progression.  Generalized T wave flattening.  VPC.  No acute changes.\par EKG 1/18/19. Sinus rhythm. Poor R wave progression. QR complex lead 3. No acute changes.

## 2022-02-15 NOTE — DISCUSSION/SUMMARY
[FreeTextEntry1] : Brief recommendations and follow-up: (see above for details)\par \par The patient's overall cardiovascular status is stable.\par As noted because of his hematuria I am going to discontinue his aspirin which has been used empirically.\par Medications reviewed and renewed.\par I encouraged cardiovascular risk factor reduction.\par I also encouraged him to get the booster for the COVID-19 vaccine.\par Next routine cardiology visit 1 year.

## 2022-02-15 NOTE — REVIEW OF SYSTEMS
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 [FreeTextEntry5] : See HPI. [FreeTextEntry6] : He reports snoring but no sleep apnea. [FreeTextEntry7] : Occasional constipation.  Improved hemorrhoids.  Treated GERD.  Evaluation by GI for Newman's esophagus.  History of colon polypectomy.  Evaluation for pancreatic abnormality. [FreeTextEntry8] : Nocturia x2 or 3.  Dr. Cannon.  Treated ED. episode of hematuria with clots.  History of reported low-grade low-volume prostate cancer followed with "watchful waiting".  [FreeTextEntry9] : Overall mild arthritis.  History of gout.

## 2022-02-15 NOTE — PHYSICAL EXAM
[Well Developed] : well developed [Well Nourished] : well nourished [No Acute Distress] : no acute distress [Normal Venous Pressure] : normal venous pressure [No Carotid Bruit] : no carotid bruit [Normal S1, S2] : normal S1, S2 [No Rub] : no rub [No Gallop] : no gallop [Clear Lung Fields] : clear lung fields [Good Air Entry] : good air entry [No Respiratory Distress] : no respiratory distress  [Soft] : abdomen soft [Non Tender] : non-tender [No Masses/organomegaly] : no masses/organomegaly [Normal Bowel Sounds] : normal bowel sounds [Normal Gait] : normal gait [No Edema] : no edema [No Cyanosis] : no cyanosis [No Clubbing] : no clubbing [No Varicosities] : no varicosities [No Rash] : no rash [No Skin Lesions] : no skin lesions [Moves all extremities] : moves all extremities [No Focal Deficits] : no focal deficits [Normal Speech] : normal speech [Alert and Oriented] : alert and oriented [Normal memory] : normal memory [de-identified] : Right eye blindness. [de-identified] : Faint 1/6 systolic murmur left sternal border.

## 2022-02-15 NOTE — HISTORY OF PRESENT ILLNESS
[FreeTextEntry1] : This 71 year-old male patient presents for cardiovascular evaluation.\par \par His problem list is as noted above.\par \par Since last examination 1 year ago he has had some medical interactions.  He has had follow-up with his gastroenterologist.  He also had an episode of hematuria.  He has been evaluated by Dr. Cannon.  This has cleared.\par \par Patient has been active chopping wood.  No formal exercise and I did encourage him.\par \par There have been no acute symptoms of chest discomfort, shortness of breath, palpitation, lightheadedness, fainting.

## 2022-02-15 NOTE — CARDIOLOGY SUMMARY
[de-identified] : Stress echocardiogram 2002. Normal. 9 minutes. Charlie stage III. \par  [de-identified] : Echocardiogram 2005 normal. EF 65%\par  [de-identified] : MRA 2007. Tortuosity right subclavian vessel. No stenosis    \par Carotid duplex 1/21/2015. Insignificant plaque only. No significant stenosis. Less than  50%.\par

## 2022-03-28 ENCOUNTER — RX RENEWAL (OUTPATIENT)
Age: 72
End: 2022-03-28

## 2022-09-24 ENCOUNTER — RX RENEWAL (OUTPATIENT)
Age: 72
End: 2022-09-24

## 2023-02-14 ENCOUNTER — NON-APPOINTMENT (OUTPATIENT)
Age: 73
End: 2023-02-14

## 2023-02-21 ENCOUNTER — APPOINTMENT (OUTPATIENT)
Dept: CARDIOLOGY | Facility: CLINIC | Age: 73
End: 2023-02-21
Payer: MEDICARE

## 2023-02-21 ENCOUNTER — NON-APPOINTMENT (OUTPATIENT)
Age: 73
End: 2023-02-21

## 2023-02-21 VITALS
DIASTOLIC BLOOD PRESSURE: 62 MMHG | HEIGHT: 72 IN | WEIGHT: 180 LBS | BODY MASS INDEX: 24.38 KG/M2 | SYSTOLIC BLOOD PRESSURE: 126 MMHG

## 2023-02-21 DIAGNOSIS — N52.8 OTHER MALE ERECTILE DYSFUNCTION: ICD-10-CM

## 2023-02-21 DIAGNOSIS — Z82.49 FAMILY HISTORY OF ISCHEMIC HEART DISEASE AND OTHER DISEASES OF THE CIRCULATORY SYSTEM: ICD-10-CM

## 2023-02-21 PROCEDURE — 93000 ELECTROCARDIOGRAM COMPLETE: CPT

## 2023-02-21 PROCEDURE — 99214 OFFICE O/P EST MOD 30 MIN: CPT | Mod: 25

## 2023-02-21 RX ORDER — SILDENAFIL 20 MG/1
20 TABLET ORAL
Qty: 10 | Refills: 8 | Status: ACTIVE | COMMUNITY

## 2023-02-21 RX ORDER — AMOXICILLIN AND CLAVULANATE POTASSIUM 875; 125 MG/1; MG/1
875-125 TABLET, COATED ORAL
Qty: 20 | Refills: 0 | Status: DISCONTINUED | COMMUNITY
Start: 2022-12-12

## 2023-02-21 RX ORDER — DILTIAZEM HYDROCHLORIDE 240 MG/1
240 CAPSULE, EXTENDED RELEASE ORAL
Qty: 90 | Refills: 3 | Status: ACTIVE | COMMUNITY
Start: 1900-01-01 | End: 1900-01-01

## 2023-02-21 RX ORDER — RAMIPRIL 10 MG/1
10 CAPSULE ORAL DAILY
Qty: 90 | Refills: 3 | Status: ACTIVE | COMMUNITY
Start: 1900-01-01 | End: 1900-01-01

## 2023-02-21 RX ORDER — ROSUVASTATIN CALCIUM 5 MG/1
5 TABLET, FILM COATED ORAL DAILY
Qty: 90 | Refills: 3 | Status: ACTIVE | COMMUNITY
Start: 2019-01-18 | End: 1900-01-01

## 2023-02-21 NOTE — CARDIOLOGY SUMMARY
[de-identified] : Stress echocardiogram 2002. Normal. 9 minutes. Charlie stage III. \par  [de-identified] : Echocardiogram 2005 normal. EF 65%\par  [de-identified] : MRA 2007. Tortuosity right subclavian vessel. No stenosis    \par Carotid duplex 1/21/2015. Insignificant plaque only. No significant stenosis. Less than  50%.\par

## 2023-02-21 NOTE — PHYSICAL EXAM
[de-identified] : Right eye blindness. [de-identified] : Faint 1/6 systolic murmur left sternal border.

## 2023-02-21 NOTE — REVIEW OF SYSTEMS
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 [FreeTextEntry5] : See HPI. [FreeTextEntry6] : He reports snoring but no sleep apnea. [FreeTextEntry7] : Occasional constipation.  Improved hemorrhoids.  Treated GERD.  Evaluation by GI for Newman's esophagus.  History of colon polypectomy.  Evaluation for pancreatic abnormality. [FreeTextEntry8] : Nocturia x2 or 3.  Dr. Cannon.  Treated ED. episode of hematuria with clots.  History of reported low-grade low-volume prostate cancer followed with "watchful waiting".  [FreeTextEntry9] : Overall mild arthritis.  History of gout.

## 2023-02-21 NOTE — ASSESSMENT
[FreeTextEntry1] : EKG 2/21/2023.  Sinus rhythm.  Poor R wave progression.  QR complex lead III.  No acute changes.\par EKG 2/15/2022.  Sinus rhythm.  Poor R wave progression.  QR complex lead III.  No acute changes.\par EKG 2/9/2021.  Sinus rhythm.  QR complex lead III.  T wave inversion lead III aVF.  Poor R wave progression.  No acute features.\par EKG 2/5/2020.  Sinus rhythm.  QR complex lead III.  Poor R wave progression.  Generalized T wave flattening.  VPC.  No acute changes.\par EKG 1/18/19. Sinus rhythm. Poor R wave progression. QR complex lead 3. No acute changes.

## 2023-02-21 NOTE — DISCUSSION/SUMMARY
[FreeTextEntry1] : Brief recommendations and follow-up: (see above for details)\par \par Patient's overall cardiovascular status is stable.\par Lipids under good control.\par Blood pressure under good control.\par I advised him to exercise as best he can.  He has had some limitations and as noted has been very busy with his wife's health care.  Thankfully she is improving.\par Medications reviewed, reconciled, renewed.\par Next routine visit 1 year.

## 2023-02-21 NOTE — HISTORY OF PRESENT ILLNESS
[FreeTextEntry1] : This 72 year-old male patient presents for cardiovascular evaluation.\par \par His problem list is as noted above.\par \par Since his last examination 1 year ago he reports some medical interactions.  He has had follow-up with his primary care physician as well as his urologist.\par \par He did have COVID last December.  He did not require antiviral therapy.\par \par He has noted some mild lightheadedness but no fainting.  No symptoms of chest discomfort, shortness of breath, palpitation.\par \par He has been under considerable stress at home.  His wife has epilepsy and is suffered some seizures with fractured limbs that required him to become very involved with her care because of her nonweightbearing state.  Thankfully there has been some improvement.\par

## 2023-03-16 ENCOUNTER — RX RENEWAL (OUTPATIENT)
Age: 73
End: 2023-03-16

## 2023-04-25 ENCOUNTER — RESULT REVIEW (OUTPATIENT)
Age: 73
End: 2023-04-25

## 2023-09-11 ENCOUNTER — NON-APPOINTMENT (OUTPATIENT)
Age: 73
End: 2023-09-11

## 2023-10-01 PROBLEM — Z92.3 HISTORY OF RADIATION THERAPY: Status: RESOLVED | Noted: 2019-01-17 | Resolved: 2023-10-01

## 2023-11-16 ENCOUNTER — APPOINTMENT (OUTPATIENT)
Dept: BREAST CENTER | Facility: CLINIC | Age: 73
End: 2023-11-16
Payer: MEDICARE

## 2023-11-16 VITALS
BODY MASS INDEX: 24.5 KG/M2 | SYSTOLIC BLOOD PRESSURE: 148 MMHG | TEMPERATURE: 97.3 F | WEIGHT: 175 LBS | HEIGHT: 71 IN | HEART RATE: 55 BPM | DIASTOLIC BLOOD PRESSURE: 72 MMHG | OXYGEN SATURATION: 95 %

## 2023-11-16 PROCEDURE — 99203 OFFICE O/P NEW LOW 30 MIN: CPT

## 2023-11-21 ENCOUNTER — NON-APPOINTMENT (OUTPATIENT)
Age: 73
End: 2023-11-21

## 2023-11-21 ENCOUNTER — APPOINTMENT (OUTPATIENT)
Dept: BREAST CENTER | Facility: CLINIC | Age: 73
End: 2023-11-21
Payer: MEDICARE

## 2023-11-21 ENCOUNTER — RESULT REVIEW (OUTPATIENT)
Age: 73
End: 2023-11-21

## 2023-11-21 VITALS
BODY MASS INDEX: 24.41 KG/M2 | WEIGHT: 175 LBS | TEMPERATURE: 53 F | SYSTOLIC BLOOD PRESSURE: 161 MMHG | DIASTOLIC BLOOD PRESSURE: 74 MMHG | HEART RATE: 97 BPM

## 2023-11-21 PROCEDURE — 11604 EXC TR-EXT MAL+MARG 3.1-4 CM: CPT

## 2023-11-27 ENCOUNTER — APPOINTMENT (OUTPATIENT)
Dept: BREAST CENTER | Facility: CLINIC | Age: 73
End: 2023-11-27
Payer: MEDICARE

## 2023-11-27 VITALS
BODY MASS INDEX: 24.5 KG/M2 | TEMPERATURE: 97.4 F | DIASTOLIC BLOOD PRESSURE: 72 MMHG | HEART RATE: 83 BPM | HEIGHT: 71 IN | SYSTOLIC BLOOD PRESSURE: 158 MMHG | WEIGHT: 175 LBS | OXYGEN SATURATION: 99 %

## 2023-11-27 DIAGNOSIS — T81.49XA INFECTION FOLLOWING A PROCEDURE, OTHER SURGICAL SITE, INITIAL ENCOUNTER: ICD-10-CM

## 2023-11-27 DIAGNOSIS — L03.90 CELLULITIS, UNSPECIFIED: ICD-10-CM

## 2023-11-27 PROCEDURE — 99024 POSTOP FOLLOW-UP VISIT: CPT

## 2023-11-28 PROBLEM — T81.49XA WOUND INFECTION AFTER SURGERY: Status: ACTIVE | Noted: 2023-11-28

## 2023-12-04 ENCOUNTER — APPOINTMENT (OUTPATIENT)
Dept: BREAST CENTER | Facility: CLINIC | Age: 73
End: 2023-12-04
Payer: MEDICARE

## 2023-12-04 VITALS
BODY MASS INDEX: 24.41 KG/M2 | HEART RATE: 53 BPM | SYSTOLIC BLOOD PRESSURE: 161 MMHG | DIASTOLIC BLOOD PRESSURE: 70 MMHG | WEIGHT: 175 LBS | OXYGEN SATURATION: 97 %

## 2023-12-04 DIAGNOSIS — D03.59 MELANOMA IN SITU OF OTHER PART OF TRUNK: ICD-10-CM

## 2023-12-04 PROCEDURE — 99024 POSTOP FOLLOW-UP VISIT: CPT

## 2023-12-05 PROBLEM — D03.59 MELANOMA IN SITU OF TRUNK: Status: ACTIVE | Noted: 2023-11-16

## 2024-02-27 ENCOUNTER — NON-APPOINTMENT (OUTPATIENT)
Age: 74
End: 2024-02-27

## 2024-02-28 ENCOUNTER — APPOINTMENT (OUTPATIENT)
Dept: CARDIOLOGY | Facility: CLINIC | Age: 74
End: 2024-02-28
Payer: MEDICARE

## 2024-02-28 ENCOUNTER — NON-APPOINTMENT (OUTPATIENT)
Age: 74
End: 2024-02-28

## 2024-02-28 VITALS
HEIGHT: 71 IN | DIASTOLIC BLOOD PRESSURE: 78 MMHG | SYSTOLIC BLOOD PRESSURE: 130 MMHG | RESPIRATION RATE: 18 BRPM | BODY MASS INDEX: 25.14 KG/M2 | HEART RATE: 68 BPM | WEIGHT: 179.56 LBS | OXYGEN SATURATION: 68 % | TEMPERATURE: 97.5 F

## 2024-02-28 DIAGNOSIS — I10 ESSENTIAL (PRIMARY) HYPERTENSION: ICD-10-CM

## 2024-02-28 DIAGNOSIS — Z01.89 ENCOUNTER FOR OTHER SPECIFIED SPECIAL EXAMINATIONS: ICD-10-CM

## 2024-02-28 DIAGNOSIS — E78.5 HYPERLIPIDEMIA, UNSPECIFIED: ICD-10-CM

## 2024-02-28 DIAGNOSIS — I65.29 OCCLUSION AND STENOSIS OF UNSPECIFIED CAROTID ARTERY: ICD-10-CM

## 2024-02-28 DIAGNOSIS — R73.03 PREDIABETES.: ICD-10-CM

## 2024-02-28 DIAGNOSIS — Z92.89 PERSONAL HISTORY OF OTHER MEDICAL TREATMENT: ICD-10-CM

## 2024-02-28 PROCEDURE — 93000 ELECTROCARDIOGRAM COMPLETE: CPT

## 2024-02-28 PROCEDURE — 99214 OFFICE O/P EST MOD 30 MIN: CPT | Mod: 25

## 2024-02-28 RX ORDER — TAMSULOSIN HYDROCHLORIDE 0.4 MG/1
0.4 CAPSULE ORAL
Qty: 180 | Refills: 3 | Status: ACTIVE | COMMUNITY

## 2024-02-28 RX ORDER — CEFADROXIL 500 MG/1
500 CAPSULE ORAL TWICE DAILY
Qty: 14 | Refills: 0 | Status: DISCONTINUED | COMMUNITY
Start: 2023-11-27 | End: 2024-02-28

## 2024-02-28 NOTE — PHYSICAL EXAM
[de-identified] : Right eye blindness. [de-identified] : Faint 1/6 systolic murmur left sternal border.

## 2024-02-28 NOTE — HISTORY OF PRESENT ILLNESS
[FreeTextEntry1] : This 73 year-old male patient presents for cardiovascular evaluation.  His problem list is as noted above.  Since his last examination the patient reports no major events or hospitalizations.  He is active and exercising.  There are no symptoms of acute shortness of breath, chest discomfort, palpitation, lightheadedness, fainting.  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  He had successful resection.  The patient has seen his primary care physician and had laboratories that were kindly provided.

## 2024-02-28 NOTE — REVIEW OF SYSTEMS
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 [FreeTextEntry5] : See HPI. [FreeTextEntry6] : He reports snoring but no sleep apnea. [FreeTextEntry8] : Nocturia x2 or 3.  Dr. Cannon.  Treated ED. he has had episode of hematuria with clots.  Prostate cancer. He reports Dr. Cannon indicates he does not have prostate cancer which was a previous consideration. [FreeTextEntry7] : Occasional constipation.  Improved hemorrhoids.  Treated GERD.  Evaluation by GI for Newman's esophagus.  History of colon polypectomy.  Evaluation for pancreatic abnormality. [FreeTextEntry9] : Overall mild arthritis.  History of gout. [de-identified] : Treatment of abdominal wall melanoma with resection. [de-identified] : He reports some anxiety.

## 2024-02-28 NOTE — CARDIOLOGY SUMMARY
[de-identified] : Stress echocardiogram 2002. Normal. 9 minutes. Charlie stage III. \par   [de-identified] : Echocardiogram 2005 normal. EF 65%\par   [de-identified] : MRA 2007. Tortuosity right subclavian vessel. No stenosis    \par  Carotid duplex 1/21/2015. Insignificant plaque only. No significant stenosis. Less than  50%.\par

## 2024-02-28 NOTE — ASSESSMENT
[FreeTextEntry1] : EKG 2/28/2024.  Sinus bradycardia.  Heart rate 58.  Poor R wave progression.  QR complex lead III.  No acute changes. EKG 2/21/2023.  Sinus rhythm.  Poor R wave progression.  QR complex lead III.  No acute changes. EKG 2/15/2022.  Sinus rhythm.  Poor R wave progression.  QR complex lead III.  No acute changes. EKG 2/9/2021.  Sinus rhythm.  QR complex lead III.  T wave inversion lead III aVF.  Poor R wave progression.  No acute features.

## 2024-02-28 NOTE — DISCUSSION/SUMMARY
[FreeTextEntry1] : Brief recommendations and follow-up: (see above for details)  The patient's overall cardiovascular status is stable. Blood pressure under good control. Lipids excellent. At this point I believe we can remain available on request.  The patient will certainly call if any change in his status and we can see him at the request of his physician of course.  In view of my senior care I wished him very well and will miss him.

## 2024-03-11 ENCOUNTER — RX RENEWAL (OUTPATIENT)
Age: 74
End: 2024-03-11

## 2024-03-11 RX ORDER — OMEPRAZOLE 40 MG/1
40 CAPSULE, DELAYED RELEASE ORAL
Qty: 90 | Refills: 3 | Status: ACTIVE | COMMUNITY
Start: 2023-09-12 | End: 1900-01-01

## 2024-08-08 ENCOUNTER — APPOINTMENT (OUTPATIENT)
Dept: GASTROENTEROLOGY | Facility: CLINIC | Age: 74
End: 2024-08-08

## 2024-08-08 PROCEDURE — G2211 COMPLEX E/M VISIT ADD ON: CPT

## 2024-08-08 PROCEDURE — 99204 OFFICE O/P NEW MOD 45 MIN: CPT

## 2024-08-08 NOTE — REASON FOR VISIT
[Consultation] : a consultation visit [FreeTextEntry1] : Kindly asked by Dr. Garcia to consult and evaluate patient for    Newman's. Panc cyst, colon polyps                 A copy of this note is being sent to physician requesting consultation.

## 2024-08-08 NOTE — ASSESSMENT
[FreeTextEntry1] : -pancreas cyst - no concerning features - repeat MRI due 4/2025  -Barretts -  PPI.  Dietary and lifestyle modifications, including weight loss, smaller/frequent/earlier (>3h prior to lying down) meals, trials of cutting down/out caffeine, chocolate, tomatoes, fatty foods, alcohol.  EGD due now  -hx colon polyps - in light of penultimate colonoscopy with 9 adenomas, some advanced, will plan colonoscpoy now at 3y inteval.  -renal lesion f/u w/ Dr. Cannon  PMD/consultation notes and Labs/imaging/prior endoscopic results reviewed to extent noted in HPI; and, if procedure code billed on this visit for lab draw, this serves to signify that labs were drawn here in this office.

## 2024-08-08 NOTE — CONSULT LETTER
[FreeTextEntry1] : Dear Dr. SAM LAWLER ,  I had the pleasure of evaluating your patient,  JAY ESCOBAR.  Please refer to my note below.  Thank you very much for allowing me to participate in the care of this patient.  If you have any questions, please do not hesitate to contact me.  Sincerely,   Bin Alexandra MD

## 2024-08-08 NOTE — HISTORY OF PRESENT ILLNESS
[FreeTextEntry1] : 73m HTN, HLD, melanoma in situ, prostate ca, renal lesion (sees [facility]), hypothyroid, panc cysts, Newman's, colon polyps, here for evaluation.    Heartburn controlled on ppi. No dysphagia. No wt loss. Hx colon polyps - no bleeding, pain.  As below. Hx panc cyst. As below.  Panc cyst: -had  w/u for hematuria - renal lesion - followed by Dr. Cannon - prompted MRI (panc cyst f/u): MRI+/- 1/29/21: renal lesion in question corresponds to a 1.8 cm hemorrhagic/proteinaceous left renal cyst (Bosniak 2). Hepatic steatosis. Poorly delineated left adrenal adenoma(s). 6 mm pancreatic head cystic focus. Consider follow-up MRI/MRCP with and without contrast in 2 years time. -lfts, ca 19-9, cbc all unremarkable 2/2/0/21 -4/2023 stable panc cyst - 2y f/u  Newman's hx:  had 2 bxs '00 and '01 w/ "focal mild dysplasia" as read by same pathologist both years. All (multiple) EGD since w/nondysplastic Barretts bxs since over the years.   Newman's hx: had 2 bxs '00 and '01 w/ "focal mild dysplasia" as read by same pathologist both years. Never confirmed by 2nd pathologist.  [facility]im[facility]) - deemed nondysplastic. EGD/colon 7/2016: 3 diminutive colon adenomas - repeat colon 3y EGD w/ Dr. Nichols 1/2017 C6M8 nondysplastic Newman's w/ bx q1cm - rec 2y f/u EGD/colon  2/2020 C8M8 nondysplastic Newman's, multiple colon polyps - rec egd 3y/colon 1y EGD/colon 3/2021 - C8M8 nondysplastic Newman's, 2 adnomas in colon - 3y egd, 5y colon  Soc:  no tobacco or significant EtOH FHx: no FHx GI malignancy or IBD  ROS: Constitutional:: no weight loss, fevers ENT: no deafness Eyes: not blind Neck: no LN Chest: no dyspnea/cough Cardiac: no chest pain Vascular: no leg swelling GI: no abdominal pain, nausea, vomiting, diarrhea, constipation, rectal bleeding, dysphagia, melena unless otherwise noted in HPI : no dysuria, dark urine Skin: no rashes, jaundice Heme: no bleeding Endocrine: no DM unless otherwise stated in HPI  Px: (VS noted below) General: NAD Eyes: anicteric Oropharynx:  clear Neck: no LN Chest: normal respiratory effort CVS: regular Abd: soft, NT, ND, +BS, no HSM Ext: no atrophy Neuro: grossly nonfocal  Labs/imaging/prior endoscopic results reviewed to the extent available and noted in HPI

## 2024-08-30 ENCOUNTER — RX RENEWAL (OUTPATIENT)
Age: 74
End: 2024-08-30

## 2024-09-04 ENCOUNTER — APPOINTMENT (OUTPATIENT)
Dept: GASTROENTEROLOGY | Facility: HOSPITAL | Age: 74
End: 2024-09-04

## 2024-09-17 ENCOUNTER — RESULT REVIEW (OUTPATIENT)
Age: 74
End: 2024-09-17

## 2024-09-18 ENCOUNTER — APPOINTMENT (OUTPATIENT)
Dept: GASTROENTEROLOGY | Facility: HOSPITAL | Age: 74
End: 2024-09-18

## 2025-08-18 ENCOUNTER — RX RENEWAL (OUTPATIENT)
Age: 75
End: 2025-08-18